# Patient Record
Sex: FEMALE | Race: WHITE | Employment: FULL TIME | URBAN - METROPOLITAN AREA
[De-identification: names, ages, dates, MRNs, and addresses within clinical notes are randomized per-mention and may not be internally consistent; named-entity substitution may affect disease eponyms.]

---

## 2020-09-28 ENCOUNTER — HOSPITAL ENCOUNTER (EMERGENCY)
Age: 30
Discharge: HOME | End: 2020-09-28
Payer: COMMERCIAL

## 2020-09-28 ENCOUNTER — APPOINTMENT (OUTPATIENT)
Dept: CT IMAGING | Age: 30
End: 2020-09-28
Payer: COMMERCIAL

## 2020-09-28 VITALS
OXYGEN SATURATION: 99 % | HEART RATE: 82 BPM | RESPIRATION RATE: 17 BRPM | SYSTOLIC BLOOD PRESSURE: 119 MMHG | TEMPERATURE: 99.2 F | DIASTOLIC BLOOD PRESSURE: 81 MMHG | WEIGHT: 157.63 LBS | HEIGHT: 63 IN | BODY MASS INDEX: 27.93 KG/M2

## 2020-09-28 PROCEDURE — 99283 EMERGENCY DEPT VISIT LOW MDM: CPT

## 2020-09-28 PROCEDURE — 6360000002 HC RX W HCPCS: Performed by: NURSE PRACTITIONER

## 2020-09-28 PROCEDURE — 96372 THER/PROPH/DIAG INJ SC/IM: CPT

## 2020-09-28 PROCEDURE — 70450 CT HEAD/BRAIN W/O DYE: CPT

## 2020-09-28 PROCEDURE — 72125 CT NECK SPINE W/O DYE: CPT

## 2020-09-28 RX ORDER — ONDANSETRON 4 MG/1
4 TABLET, ORALLY DISINTEGRATING ORAL EVERY 8 HOURS PRN
Qty: 20 TABLET | Refills: 0 | Status: SHIPPED | OUTPATIENT
Start: 2020-09-28

## 2020-09-28 RX ORDER — KETOROLAC TROMETHAMINE 30 MG/ML
30 INJECTION, SOLUTION INTRAMUSCULAR; INTRAVENOUS ONCE
Status: COMPLETED | OUTPATIENT
Start: 2020-09-28 | End: 2020-09-28

## 2020-09-28 RX ORDER — IBUPROFEN 400 MG/1
400 TABLET ORAL EVERY 6 HOURS PRN
Qty: 30 TABLET | Refills: 0 | Status: SHIPPED | OUTPATIENT
Start: 2020-09-28

## 2020-09-28 RX ADMIN — KETOROLAC TROMETHAMINE 30 MG: 30 INJECTION, SOLUTION INTRAMUSCULAR at 16:13

## 2020-09-28 ASSESSMENT — PAIN DESCRIPTION - PROGRESSION: CLINICAL_PROGRESSION: NOT CHANGED

## 2020-09-28 ASSESSMENT — ENCOUNTER SYMPTOMS
BACK PAIN: 0
DIARRHEA: 0
COUGH: 0
SHORTNESS OF BREATH: 0
SORE THROAT: 0
NAUSEA: 0
ABDOMINAL PAIN: 0
COLOR CHANGE: 0
WHEEZING: 0
VOMITING: 0

## 2020-09-28 ASSESSMENT — PAIN SCALES - GENERAL
PAINLEVEL_OUTOF10: 7
PAINLEVEL_OUTOF10: 8
PAINLEVEL_OUTOF10: 8

## 2020-09-28 ASSESSMENT — PAIN DESCRIPTION - FREQUENCY: FREQUENCY: CONTINUOUS

## 2020-09-28 ASSESSMENT — PAIN DESCRIPTION - PAIN TYPE: TYPE: ACUTE PAIN

## 2020-09-28 ASSESSMENT — PAIN DESCRIPTION - ONSET: ONSET: ON-GOING

## 2020-09-28 ASSESSMENT — PAIN DESCRIPTION - DESCRIPTORS: DESCRIPTORS: ACHING

## 2020-09-28 ASSESSMENT — PAIN DESCRIPTION - LOCATION: LOCATION: HEAD

## 2020-09-28 ASSESSMENT — PAIN DESCRIPTION - ORIENTATION: ORIENTATION: UPPER

## 2020-09-28 ASSESSMENT — PAIN - FUNCTIONAL ASSESSMENT: PAIN_FUNCTIONAL_ASSESSMENT: PREVENTS OR INTERFERES SOME ACTIVE ACTIVITIES AND ADLS

## 2020-09-28 NOTE — ED PROVIDER NOTES
**ADVANCED PRACTICE PROVIDER, I HAVE EVALUATED THIS PATIENT**        [address]      Pt Name: Madeleine Ambrocio  XBA:[id number]  [place] 1990  Date of evaluation: 9/28/2020  Provider: STEPHANIE Mendes CNP      Chief Complaint:    Chief Complaint   Patient presents with    Head Injury     pt at work and was hit in head with 18 pound box of cheese. nausea without emesis. pt lightheaded        Nursing Notes, Past Medical Hx, Past Surgical Hx, Social Hx, Allergies, and Family Hx were all reviewed and agreed with or any disagreements were addressed in the HPI.    HPI:  (Location, Duration, Timing, Severity, Quality, Assoc Sx, Context, Modifying factors)  This is a  30 y.o. female who presents emergency department with closed head injury, patient states around 11 or 1130 this morning she was at work when an 18 pound block of cheese fell on top of her head when she was in a squatting position, she is complaining of occipital headache, denies any loss of consciousness. She states that she has felt nauseous since the head injury but not had any vomiting. She denies any neck or back pain. No recent cough, congestion, fever or chills, no chest pain put of chest pain or shortness of breath. Has not taken any medicine for the pain. She rates the discomfort a 8 out of 10. She denies any additional injuries or complaints. No additional aggravating relieving factors. Patient presents awake, alert in the no acute distress or toxic appearance. PastMedical/Surgical History:      Diagnosis Date    Back pain     WITH SCIATICA    Endometriosis     Gestational diabetes          Procedure Laterality Date    LAPAROSCOPY      X2       Medications:  Previous Medications    CYCLOBENZAPRINE (FLEXERIL) 10 MG TABLET    Take 10 mg by mouth 3 times daily as needed for Muscle spasms.  HASNT FILLED YET    PRENATAL MULTIVIT-MIN-FE-FA (PRENATAL #2 PO) Take  by mouth. Review of Systems:  Review of Systems   Constitutional: Negative for chills and fever. HENT: Negative for congestion and sore throat. Respiratory: Negative for cough, shortness of breath and wheezing. Cardiovascular: Negative for chest pain. Gastrointestinal: Negative for abdominal pain, diarrhea, nausea and vomiting. Musculoskeletal: Negative for back pain. Skin: Negative for color change. Neurological: Positive for headaches. Negative for weakness and numbness. Patient presents with closed head injury, patient states around 11 or 1130 this morning she was at work when an 18 pound block of cheese fell on top of her head when she was in a squatting position, she is complaining of occipital headache, denies any loss of consciousness. She states that she has felt nauseous since the head injury but not had any vomiting. Positives and Pertinent negatives as per HPI. Except as noted above in the ROS, problem specific ROS was completed and is negative. Physical Exam:  Physical Exam  Vitals signs and nursing note reviewed. Constitutional:       Appearance: She is well-developed. She is not diaphoretic. HENT:      Head: Normocephalic. Right Ear: External ear normal.      Left Ear: External ear normal.   Eyes:      General: No scleral icterus. Right eye: No discharge. Left eye: No discharge. Extraocular Movements: Extraocular movements intact. Pupils: Pupils are equal, round, and reactive to light. Comments: Pupils are 4 mm round and reactive, normal extraocular limit, no visible nystagmus, no ocular entrapment. Neck:      Musculoskeletal: Normal range of motion and neck supple. Cardiovascular:      Rate and Rhythm: Normal rate. Pulmonary:      Effort: Pulmonary effort is normal. No respiratory distress. Breath sounds: Normal breath sounds. Abdominal:      Palpations: Abdomen is soft.    Musculoskeletal: Normal range of motion. Comments: Reproducible tenderness to the C1-C2 area on exam, however there is no obvious step-off or deformity. No distal cervical thoracic or lumbar spine tenderness or step-off. She is neurologically intact with no acute focal deficits and a normal neurological exam   Skin:     General: Skin is warm. Capillary Refill: Capillary refill takes less than 2 seconds. Coloration: Skin is not pale. Neurological:      General: No focal deficit present. Mental Status: She is alert and oriented to person, place, and time. GCS: GCS eye subscore is 4. GCS verbal subscore is 5. GCS motor subscore is 6. Cranial Nerves: Cranial nerves are intact. Sensory: Sensation is intact. Motor: Motor function is intact. Comments: Patient is awake, alert following commands correctly, neurologically intact no focal deficits. No numbness or tingling or paresthesias, no saddle anesthesia, no loss of bowel bladder control urinary retention. Psychiatric:         Mood and Affect: Mood normal.         Behavior: Behavior normal.         MEDICAL DECISION MAKING    Vitals:    Vitals:    09/28/20 1245 09/28/20 1253   BP:  119/81   Pulse:  82   Resp:  17   Temp:  99.2 °F (37.3 °C)   TempSrc:  Tympanic   SpO2:  99%   Weight: 157 lb 10.1 oz (71.5 kg)    Height: 5' 3\" (1.6 m)        LABS:Labs Reviewed - No data to display     Remainder of labs reviewed and werenegative at this time or not returned at the time of this note. RADIOLOGY:   Non-plain film images such as CT, Ultrasound and MRI are read by the radiologist. Zoila DORSEY APRN - CNP have directly visualized the radiologic plain film image(s) with the below findings:        Interpretation per the Radiologist below, if available at the time of this note:    CT HEAD WO CONTRAST   Final Result   1. No acute intracranial abnormality. CT CERVICAL SPINE WO CONTRAST   Final Result   1. No acute fracture.               MEDICAL DECISION MAKING / ED COURSE:    Because of high probability of sudden clinical deterioration of the patient's condition and risk of further deterioration, critical care time required my full attention to the patient's condition; which included chart data review, documentation, medication ordering, reviewing the patient's old records, reevaluation patient's cardiac, pulmonary and neurological status. Reevaluation of vital signs. Consultations with ED attending and admitting physician. Ordering, interpreting reviewing diagnostic testing. Therefore a critical care time was 15 minutes of direct attention to the patient's condition did not include time spent on procedures. PROCEDURES:   Procedures    None    Patient was given:  Medications   ketorolac (TORADOL) injection 30 mg (30 mg Intramuscular Given 9/28/20 1613)       Patient presents with closed head injury, patient states around 11 or 1130 this morning she was at work when an 18 pound block of cheese fell on top of her head when she was in a squatting position, she is complaining of occipital headache, denies any loss of consciousness. She states that she has felt nauseous since the head injury but not had any vomiting. After evaluation and examination the patient CT scan of the head and neck were ordered. CT the head shows no acute intracranial abnormality. CT cervical spine shows no fracture or subluxation. She was given Toradol here. I do believe she has a close head injury with questionable concussion-like symptoms however of no concerns for I estimate there is LOW risk for SKULL FRACTURE, INTRACRANIAL HEMORRHAGE, CERVICAL SPINE INJURY, SUBDURAL OR EPIDURAL HEMATOMA,  thus I consider the discharge disposition reasonable. Therefore, shared medical decision was made between the patient myself only agreed the patient could be discharged home with outpatient follow-up. She was given referral back to her PCP.   Discharged home with Motrin and Zofran. I did educate her about brain rest for the next 36 hours. Return to the ER for worsening symptoms including seizure, excessive vomiting or change in mental status. The patient tolerated their visit well. I evaluated the patient. The physician was available for consultation as needed. The patient and / or the family were informed of the results of any tests, a time was given to answer questions, a plan was proposed and they agreed with plan. Patient verbalized understanding of discharge instructions and was discharged from the department in stable condition. CLINICAL IMPRESSION:  1.  Closed head injury, initial encounter        DISPOSITION Decision To Discharge 09/28/2020 04:24:39 PM      PATIENT REFERRED TO:  [place]  [address] [facility] [address]  [phone]    Schedule an appointment as soon as possible for a visit in 2 days  Follow-up with family doctor in the next 2 to 3 days for reevaluation    [facility] Emergency Department  [address] [id number] [phone]  Go to   If symptoms worsen      DISCHARGE MEDICATIONS:  New Prescriptions    IBUPROFEN (IBU) 400 MG TABLET    Take 1 tablet by mouth every 6 hours as needed for Pain    ONDANSETRON (ZOFRAN ODT) 4 MG DISINTEGRATING TABLET    Take 1 tablet by mouth every 8 hours as needed for Nausea       DISCONTINUED MEDICATIONS:  Discontinued Medications    No medications on file              (Please note the MDM and HPI sections of this note were completed with a voice recognition program.  Efforts were made to edit the dictations but occasionally words are mis-transcribed.)    Electronically signed, STEPHANIE Rich CNP,          STEPHANIE Rich CNP  09/28/20 9305

## 2020-09-29 NOTE — PROGRESS NOTES
Patient was seen by me in the past as a work-related injury. Current injury appears work-related. She need to make appointment with [facility] occupational health. I also recommend she established with PCP, I will be happy to take care of her.